# Patient Record
Sex: FEMALE | Race: WHITE
[De-identification: names, ages, dates, MRNs, and addresses within clinical notes are randomized per-mention and may not be internally consistent; named-entity substitution may affect disease eponyms.]

---

## 2017-07-10 ENCOUNTER — HOSPITAL ENCOUNTER (EMERGENCY)
Dept: HOSPITAL 53 - M ED | Age: 57
Discharge: HOME | End: 2017-07-10
Payer: COMMERCIAL

## 2017-07-10 VITALS — BODY MASS INDEX: 40.84 KG/M2 | HEIGHT: 63 IN | WEIGHT: 230.49 LBS

## 2017-07-10 VITALS — DIASTOLIC BLOOD PRESSURE: 58 MMHG | SYSTOLIC BLOOD PRESSURE: 119 MMHG

## 2017-07-10 DIAGNOSIS — G43.909: ICD-10-CM

## 2017-07-10 DIAGNOSIS — H82.9: ICD-10-CM

## 2017-07-10 DIAGNOSIS — Z79.899: ICD-10-CM

## 2017-07-10 DIAGNOSIS — Z87.891: ICD-10-CM

## 2017-07-10 DIAGNOSIS — Z79.82: ICD-10-CM

## 2017-07-10 DIAGNOSIS — Z98.84: ICD-10-CM

## 2017-07-10 DIAGNOSIS — I95.1: Primary | ICD-10-CM

## 2017-07-11 ENCOUNTER — HOSPITAL ENCOUNTER (EMERGENCY)
Dept: HOSPITAL 53 - M ED | Age: 57
Discharge: HOME | End: 2017-07-11
Payer: COMMERCIAL

## 2017-07-11 VITALS — WEIGHT: 248.02 LBS | HEIGHT: 63 IN | BODY MASS INDEX: 43.95 KG/M2

## 2017-07-11 VITALS — DIASTOLIC BLOOD PRESSURE: 74 MMHG | SYSTOLIC BLOOD PRESSURE: 138 MMHG

## 2017-07-11 DIAGNOSIS — E78.5: ICD-10-CM

## 2017-07-11 DIAGNOSIS — Z79.82: ICD-10-CM

## 2017-07-11 DIAGNOSIS — R42: Primary | ICD-10-CM

## 2017-07-11 LAB
ALBUMIN SERPL BCG-MCNC: 3.4 GM/DL (ref 3.2–5.2)
ALBUMIN/GLOB SERPL: 1.13 {RATIO} (ref 1–1.93)
ALP SERPL-CCNC: 92 U/L (ref 45–117)
ALT SERPL W P-5'-P-CCNC: 21 U/L (ref 12–78)
ANION GAP SERPL CALC-SCNC: 6 MEQ/L (ref 8–16)
AST SERPL-CCNC: 16 U/L (ref 15–37)
BASOPHILS # BLD AUTO: 0 K/MM3 (ref 0–0.2)
BASOPHILS NFR BLD AUTO: 0.4 % (ref 0–1)
BILIRUB CONJ SERPL-MCNC: < 0.1 MG/DL (ref 0–0.2)
BILIRUB SERPL-MCNC: 0.5 MG/DL (ref 0.2–1)
BUN SERPL-MCNC: 9 MG/DL (ref 7–18)
CALCIUM SERPL-MCNC: 8.9 MG/DL (ref 8.5–10.1)
CHLORIDE SERPL-SCNC: 108 MEQ/L (ref 98–107)
CO2 SERPL-SCNC: 28 MEQ/L (ref 21–32)
CREAT SERPL-MCNC: 0.77 MG/DL (ref 0.55–1.02)
EOSINOPHIL # BLD AUTO: 0.3 K/MM3 (ref 0–0.5)
EOSINOPHIL NFR BLD AUTO: 4.3 % (ref 0–3)
ERYTHROCYTE [DISTWIDTH] IN BLOOD BY AUTOMATED COUNT: 12.7 % (ref 11.5–14.5)
GFR SERPL CREATININE-BSD FRML MDRD: > 60 ML/MIN/{1.73_M2} (ref 51–?)
GLUCOSE SERPL-MCNC: 91 MG/DL (ref 70–105)
LARGE UNSTAINED CELL #: 0.1 K/MM3 (ref 0–0.4)
LARGE UNSTAINED CELL %: 1.4 % (ref 0–4)
LYMPHOCYTES # BLD AUTO: 2.6 K/MM3 (ref 1.5–4.5)
LYMPHOCYTES NFR BLD AUTO: 39.6 % (ref 24–44)
MCH RBC QN AUTO: 29.9 PG (ref 27–33)
MCHC RBC AUTO-ENTMCNC: 32.5 G/DL (ref 32–36.5)
MCV RBC AUTO: 92.1 FL (ref 80–96)
MONOCYTES # BLD AUTO: 0.3 K/MM3 (ref 0–0.8)
MONOCYTES NFR BLD AUTO: 4.4 % (ref 0–5)
NEUTROPHILS # BLD AUTO: 3.2 K/MM3 (ref 1.8–7.7)
NEUTROPHILS NFR BLD AUTO: 50 % (ref 36–66)
PLATELET # BLD AUTO: 279 K/MM3 (ref 150–450)
POTASSIUM SERPL-SCNC: 3.9 MEQ/L (ref 3.5–5.1)
PROT SERPL-MCNC: 6.4 GM/DL (ref 6.4–8.2)
SODIUM SERPL-SCNC: 142 MEQ/L (ref 136–145)
WBC # BLD AUTO: 6.3 K/MM3 (ref 4–10)

## 2017-07-11 PROCEDURE — 93005 ELECTROCARDIOGRAM TRACING: CPT

## 2017-07-11 PROCEDURE — 80076 HEPATIC FUNCTION PANEL: CPT

## 2017-07-11 PROCEDURE — 82550 ASSAY OF CK (CPK): CPT

## 2017-07-11 PROCEDURE — 70450 CT HEAD/BRAIN W/O DYE: CPT

## 2017-07-11 PROCEDURE — 80048 BASIC METABOLIC PNL TOTAL CA: CPT

## 2017-07-11 PROCEDURE — 93041 RHYTHM ECG TRACING: CPT

## 2017-07-11 PROCEDURE — 85025 COMPLETE CBC W/AUTO DIFF WBC: CPT

## 2017-07-11 PROCEDURE — 82553 CREATINE MB FRACTION: CPT

## 2017-07-11 PROCEDURE — 99284 EMERGENCY DEPT VISIT MOD MDM: CPT

## 2017-07-11 PROCEDURE — 94760 N-INVAS EAR/PLS OXIMETRY 1: CPT

## 2017-07-11 PROCEDURE — 71020: CPT

## 2017-07-11 PROCEDURE — 84443 ASSAY THYROID STIM HORMONE: CPT

## 2017-07-11 NOTE — REP
CT Head without contrast

 

HISTORY:  Altered mental status

 

COMPARISON: 09/03/2013

 

There is no intraparenchymal hemorrhage, acute infarct,  mass or midline shift.

The ventricular system is normal in appearance.  There is no extra cerebral

collection.  There is no fracture.  The visualized sinuses are clear.

 

IMPRESSION:  There is no intracranial lesion.

 

 

 

 

Signed by

Everardo Rincon MD 07/11/2017 10:54 A

## 2017-07-11 NOTE — REP
Chest two views

 

HISTORY: Altered mental status

 

Comparison: 09/03/2013

 

The lungs are clear.  The cardiac silhouette is enlarged.  The pulmonary

vasculature is normal in appearance.  The bony structure is intact.

 

IMPRESSION:  Cardiomegaly.

 

 

Signed by

Everardo Rincon MD 07/11/2017 11:10 A

## 2017-07-11 NOTE — ECGEPIP
Stationary ECG Study

                           [facility] - ED

                                       

                                       Test Date:    2017

Pat Name:     PHU KRUGER           Department:   

Patient ID:   [id number]                 Room:         -

Gender:       F                        Technician:   bao

:          1960               Requested By: SAGRARIO BRANDON

Order Number: [id number]     Reading MD:   Kimber Dick

                                 Measurements

Intervals                              Axis          

Rate:         53                       P:            53

ME:           165                      QRS:          3

QRSD:         103                      T:            42

QT:           428                                    

QTc:          405                                    

                           Interpretive Statements

SINUS BRADYCARDIA

SIMILAR 9/3/13

Electronically Signed On 2017 18:28:04 EDT by Kimber Dick

## 2019-12-03 ENCOUNTER — HOSPITAL ENCOUNTER (OUTPATIENT)
Dept: HOSPITAL 53 - M PLALAB | Age: 59
End: 2019-12-03
Attending: PHYSICIAN ASSISTANT
Payer: COMMERCIAL

## 2019-12-03 DIAGNOSIS — E55.9: ICD-10-CM

## 2019-12-03 DIAGNOSIS — E11.9: ICD-10-CM

## 2019-12-03 DIAGNOSIS — E78.2: Primary | ICD-10-CM

## 2019-12-03 LAB
25(OH)D3 SERPL-MCNC: 29.6 NG/ML (ref 30–100)
ALBUMIN SERPL BCG-MCNC: 3.6 GM/DL (ref 3.2–5.2)
ALT SERPL W P-5'-P-CCNC: 41 U/L (ref 12–78)
BILIRUB SERPL-MCNC: 0.5 MG/DL (ref 0.2–1)
BUN SERPL-MCNC: 13 MG/DL (ref 7–18)
CALCIUM SERPL-MCNC: 9.1 MG/DL (ref 8.8–10.2)
CHLORIDE SERPL-SCNC: 108 MEQ/L (ref 98–107)
CHOLEST SERPL-MCNC: 214 MG/DL (ref ?–200)
CHOLEST/HDLC SERPL: 3.96 {RATIO} (ref ?–5)
CO2 SERPL-SCNC: 31 MEQ/L (ref 21–32)
CREAT SERPL-MCNC: 0.81 MG/DL (ref 0.55–1.3)
EST. AVERAGE GLUCOSE BLD GHB EST-MCNC: 131 MG/DL (ref 60–110)
GFR SERPL CREATININE-BSD FRML MDRD: > 60 ML/MIN/{1.73_M2} (ref 45–?)
GLUCOSE SERPL-MCNC: 95 MG/DL (ref 70–100)
HDLC SERPL-MCNC: 54 MG/DL (ref 40–?)
LDLC SERPL CALC-MCNC: 145 MG/DL (ref ?–100)
NONHDLC SERPL-MCNC: 160 MG/DL
POTASSIUM SERPL-SCNC: 4.5 MEQ/L (ref 3.5–5.1)
PROT SERPL-MCNC: 6.7 GM/DL (ref 6.4–8.2)
SODIUM SERPL-SCNC: 143 MEQ/L (ref 136–145)
TRIGL SERPL-MCNC: 74 MG/DL (ref ?–150)

## 2020-01-10 ENCOUNTER — HOSPITAL ENCOUNTER (OUTPATIENT)
Dept: HOSPITAL 53 - M SFHCLACO | Age: 60
End: 2020-01-10
Attending: PHYSICIAN ASSISTANT
Payer: COMMERCIAL

## 2020-01-10 DIAGNOSIS — Z01.411: Primary | ICD-10-CM

## 2020-02-18 ENCOUNTER — HOSPITAL ENCOUNTER (OUTPATIENT)
Dept: HOSPITAL 53 - M PLAIMG | Age: 60
End: 2020-02-18
Attending: PHYSICIAN ASSISTANT
Payer: COMMERCIAL

## 2020-02-18 ENCOUNTER — HOSPITAL ENCOUNTER (OUTPATIENT)
Dept: HOSPITAL 53 - M WHC | Age: 60
End: 2020-02-18
Attending: PHYSICIAN ASSISTANT
Payer: COMMERCIAL

## 2020-02-18 DIAGNOSIS — Z12.31: Primary | ICD-10-CM

## 2020-02-18 DIAGNOSIS — Z78.0: ICD-10-CM

## 2020-02-18 DIAGNOSIS — G89.29: ICD-10-CM

## 2020-02-18 DIAGNOSIS — M25.571: Primary | ICD-10-CM

## 2020-02-18 DIAGNOSIS — M25.572: ICD-10-CM

## 2020-02-18 DIAGNOSIS — M15.0: ICD-10-CM

## 2020-02-18 LAB
CRP SERPL-MCNC: < 0.3 MG/DL (ref 0–0.3)
RHEUMATOID FACT SERPL-ACNC: < 10 IU/ML (ref ?–15)

## 2020-02-18 NOTE — REPMRS
Patient History

The patient states she had a clinical breast exam in 01/2020.

Patient is postmenopausal.

No known family history of cancer.

No Hormone Replacement Therapy

 

Digital Woman Screen Mammo: February 18, 2020 - Exam #: 

[id number]

Bilateral CC and MLO view(s) were taken.

 

Technologist: Jacqueline Nielsen, Technologist

Prior study comparison: July 16, 2014, bilateral digital mammo 

screening bilat, performed at [facility].

 

FINDINGS: There are scattered fibroglandular densities.  There 

has been no change in the appearance of the mammogram from the 

prior studies.  There is a mild amount of scattered 

fibroglandular density which is fairly symmetric. There is no 

interval development of dominant mass, architectural distortion, 

or grouped microcalcification suggestive of malignancy.

3-D tomosynthesis shows no additional findings.

 

Assessment: BI-RADS/ACR category 1 mammogram. Negative Mammogram.

 

Recommendation

Routine screening mammogram of both breasts in 1 year (for women 

over age 40).

This patient's Lifetime Breast Cancer Risk is estimated at 10.4 

%.

This mammogram was interpreted with the aid of an FDA-approved 

computer-aided dectection system.

 

Electronically Signed By: Gulshan Gimenez MD 02/18/20 [id number]

## 2020-02-18 NOTE — REPPI
Clinical:  Bilateral ankle pain.

 

Technique:  AP, lateral, bilateral oblique views of the right and left ankle.

 

Findings:

Generalized relatively symmetric mild/early moderate arthritic degenerative

changes are appreciated.  Lateral views demonstrate bilateral calcaneal heel

spurs and chronic calcification at the insertion of the Achilles tendon.  No

fracture or dislocation.  Ankle mortise appears intact bilaterally.

 

Impression:

Symmetric mild/early moderate arthritic changes.

 

 

Electronically Signed by

Milan Edwards MD 02/18/2020 01:05 P

## 2020-10-20 ENCOUNTER — HOSPITAL ENCOUNTER (OUTPATIENT)
Dept: HOSPITAL 53 - M SFHCADAM | Age: 60
End: 2020-10-20
Attending: PHYSICIAN ASSISTANT
Payer: COMMERCIAL

## 2020-10-20 DIAGNOSIS — E78.2: Primary | ICD-10-CM

## 2020-10-20 DIAGNOSIS — E55.9: ICD-10-CM

## 2020-10-20 DIAGNOSIS — E11.9: ICD-10-CM

## 2020-10-20 LAB
25(OH)D3 SERPL-MCNC: 49.7 NG/ML (ref 30–100)
ALBUMIN SERPL BCG-MCNC: 3.5 GM/DL (ref 3.2–5.2)
ALT SERPL W P-5'-P-CCNC: 30 U/L (ref 12–78)
BILIRUB SERPL-MCNC: 0.6 MG/DL (ref 0.2–1)
BUN SERPL-MCNC: 10 MG/DL (ref 7–18)
CALCIUM SERPL-MCNC: 9.1 MG/DL (ref 8.8–10.2)
CHLORIDE SERPL-SCNC: 106 MEQ/L (ref 98–107)
CHOLEST SERPL-MCNC: 203 MG/DL (ref ?–200)
CHOLEST/HDLC SERPL: 4.14 {RATIO} (ref ?–5)
CO2 SERPL-SCNC: 31 MEQ/L (ref 21–32)
CREAT SERPL-MCNC: 0.87 MG/DL (ref 0.55–1.3)
EST. AVERAGE GLUCOSE BLD GHB EST-MCNC: 134 MG/DL (ref 60–110)
GFR SERPL CREATININE-BSD FRML MDRD: > 60 ML/MIN/{1.73_M2} (ref 45–?)
GLUCOSE SERPL-MCNC: 101 MG/DL (ref 70–100)
HDLC SERPL-MCNC: 49 MG/DL (ref 40–?)
LDLC SERPL CALC-MCNC: 129 MG/DL (ref ?–100)
NONHDLC SERPL-MCNC: 154 MG/DL
POTASSIUM SERPL-SCNC: 4.7 MEQ/L (ref 3.5–5.1)
PROT SERPL-MCNC: 6.7 GM/DL (ref 6.4–8.2)
SODIUM SERPL-SCNC: 142 MEQ/L (ref 136–145)
TRIGL SERPL-MCNC: 124 MG/DL (ref ?–150)

## 2021-01-06 ENCOUNTER — HOSPITAL ENCOUNTER (EMERGENCY)
Dept: HOSPITAL 53 - M ED | Age: 61
Discharge: HOME | End: 2021-01-06
Payer: COMMERCIAL

## 2021-01-06 VITALS — HEIGHT: 62 IN | BODY MASS INDEX: 46.01 KG/M2 | WEIGHT: 250 LBS

## 2021-01-06 VITALS — SYSTOLIC BLOOD PRESSURE: 130 MMHG | DIASTOLIC BLOOD PRESSURE: 83 MMHG

## 2021-01-06 DIAGNOSIS — E11.9: ICD-10-CM

## 2021-01-06 DIAGNOSIS — E78.5: ICD-10-CM

## 2021-01-06 DIAGNOSIS — Z79.899: ICD-10-CM

## 2021-01-06 DIAGNOSIS — M54.12: Primary | ICD-10-CM

## 2021-03-04 ENCOUNTER — HOSPITAL ENCOUNTER (EMERGENCY)
Dept: HOSPITAL 53 - M ED | Age: 61
Discharge: HOME | End: 2021-03-04
Payer: COMMERCIAL

## 2021-03-04 VITALS — SYSTOLIC BLOOD PRESSURE: 141 MMHG | DIASTOLIC BLOOD PRESSURE: 69 MMHG

## 2021-03-04 VITALS — BODY MASS INDEX: 45.66 KG/M2 | HEIGHT: 63 IN | WEIGHT: 257.72 LBS

## 2021-03-04 DIAGNOSIS — S42.211A: Primary | ICD-10-CM

## 2021-03-04 DIAGNOSIS — E66.9: ICD-10-CM
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
## 2021-03-04 DIAGNOSIS — E78.5: ICD-10-CM

## 2021-03-04 DIAGNOSIS — M19.021: ICD-10-CM

## 2021-03-04 DIAGNOSIS — Z79.899: ICD-10-CM
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
## 2021-03-04 DIAGNOSIS — G25.81: ICD-10-CM

## 2021-03-04 DIAGNOSIS — Z98.84: ICD-10-CM

## 2021-03-04 DIAGNOSIS — S40.021A: ICD-10-CM

## 2021-03-04 LAB
APTT BLD: 28.1 SECONDS (ref 24.2–38.5)
BASOPHILS # BLD AUTO: 0 10^3/UL (ref 0–0.2)
BASOPHILS NFR BLD AUTO: 0.4 % (ref 0–1)
EOSINOPHIL # BLD AUTO: 0.1 10^3/UL (ref 0–0.5)
EOSINOPHIL NFR BLD AUTO: 0.7 % (ref 0–3)
HCT VFR BLD AUTO: 45.9 % (ref 36–47)
HGB BLD-MCNC: 14.5 G/DL (ref 12–15.5)
INR PPP: 1
LYMPHOCYTES # BLD AUTO: 2 10^3/UL (ref 1.5–5)
LYMPHOCYTES NFR BLD AUTO: 18.4 % (ref 24–44)
MCH RBC QN AUTO: 29.3 PG (ref 27–33)
MCHC RBC AUTO-ENTMCNC: 31.6 G/DL (ref 32–36.5)
MCV RBC AUTO: 92.7 FL (ref 80–96)
MONOCYTES # BLD AUTO: 0.6 10^3/UL (ref 0–0.8)
MONOCYTES NFR BLD AUTO: 5.8 % (ref 2–8)
NEUTROPHILS # BLD AUTO: 8 10^3/UL (ref 1.5–8.5)
NEUTROPHILS NFR BLD AUTO: 74.2 % (ref 36–66)
PLATELET # BLD AUTO: 298 10^3/UL (ref 150–450)
PROTHROMBIN TIME: 13.4 SECONDS (ref 12.5–14.3)
RBC # BLD AUTO: 4.95 10^6/UL (ref 4–5.4)
WBC # BLD AUTO: 10.7 10^3/UL (ref 4–10)

## 2021-03-04 PROCEDURE — 85025 COMPLETE CBC W/AUTO DIFF WBC: CPT

## 2021-03-04 PROCEDURE — 85730 THROMBOPLASTIN TIME PARTIAL: CPT

## 2021-03-04 PROCEDURE — 73030 X-RAY EXAM OF SHOULDER: CPT

## 2021-03-04 PROCEDURE — 73080 X-RAY EXAM OF ELBOW: CPT

## 2021-03-04 PROCEDURE — 73060 X-RAY EXAM OF HUMERUS: CPT

## 2021-03-04 PROCEDURE — 99284 EMERGENCY DEPT VISIT MOD MDM: CPT

## 2021-03-04 PROCEDURE — 85610 PROTHROMBIN TIME: CPT

## 2021-03-04 PROCEDURE — 96374 THER/PROPH/DIAG INJ IV PUSH: CPT

## 2021-03-04 PROCEDURE — 96375 TX/PRO/DX INJ NEW DRUG ADDON: CPT

## 2021-03-04 PROCEDURE — 80047 BASIC METABLC PNL IONIZED CA: CPT

## 2021-03-04 NOTE — REP
INDICATION:

fall. R proximal humerus fx



COMPARISON:

None.



TECHNIQUE:

Internal rotation, external rotation, and Y view.



FINDINGS:

There is a comminuted minimally displaced fracture through the surgical neck of the

proximal humerus with overlying soft tissue swelling.  The acromioclavicular joint

appears intact.



IMPRESSION:

Comminuted fracture of the surgical neck proximal humerus.





<Electronically signed by Milan Edwards > 03/04/21 [id number]

## 2021-03-04 NOTE — REP
INDICATION:

deformity



COMPARISON:

None.



TECHNIQUE:

AP, lateral, oblique views of the right elbow



FINDINGS:

Age-related degenerative changes are appreciated.  No obvious acute elbow fracture is

identified although small fracture versus chronic osteophyte at the coronary process

cannot definitively be differentiated.  No obvious joint effusion and normal position

to the anterior fat pad suggests a nonacute finding.



IMPRESSION:

Presumed degenerative changes as noted above.  No definite acute fracture.





<Electronically signed by Milan Edwards > 03/04/21 [id number]

## 2021-03-04 NOTE — REP
INDICATION:

deformity



COMPARISON:

None.



TECHNIQUE:

Two views of the right humerus



FINDINGS:

There is a comminuted mildly displaced fracture through the surgical neck of the

proximal humerus.  Remainder of the visualized osseous structures and surrounding soft

tissues are grossly unremarkable.



IMPRESSION:

Comminuted fracture of the surgical neck proximal humeral metaphysis.





<Electronically signed by Milan Edwards > 03/04/21 [id number]

## 2021-03-08 ENCOUNTER — HOSPITAL ENCOUNTER (OUTPATIENT)
Dept: HOSPITAL 53 - M SOG | Age: 61
End: 2021-03-08
Attending: ORTHOPAEDIC SURGERY
Payer: COMMERCIAL

## 2021-03-08 DIAGNOSIS — S42.221D: Primary | ICD-10-CM

## 2021-03-10 NOTE — REP
INDICATION:

F/U FX.



COMPARISON:

None.



TECHNIQUE:

Axillary and internal rotation views of the right shoulder



FINDINGS:

She sees relatively stable comminuted fracture to the proximal humeral metaphysis

again noted.



IMPRESSION:

Stable comminuted fracture to the proximal humeral metaphysis.





<Electronically signed by Milan Edwards > 03/10/21 0780

## 2021-03-29 ENCOUNTER — HOSPITAL ENCOUNTER (OUTPATIENT)
Dept: HOSPITAL 53 - M SOG | Age: 61
End: 2021-03-29
Attending: ORTHOPAEDIC SURGERY
Payer: COMMERCIAL

## 2021-03-29 DIAGNOSIS — S42.221D: Primary | ICD-10-CM
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
## 2021-03-29 NOTE — REP
INDICATION:

F/U FX.



COMPARISON:

Comparison right shoulder radiographs March 8, 2021 and March 4, 2021..



TECHNIQUE:

Four views are provided.



FINDINGS:

An impacted surgical neck fracture of the right humerus is again seen with mineralized

callus visible medially and laterally on today's radiographs.  No change in position.

Glenohumeral articulation is normally aligned.  There is some AC joint osteoarthritic

hypertrophy.



IMPRESSION:

Healing surgical neck fracture right proximal humerus.





<Electronically signed by Gulshan Gimenez > 03/29/21 1955

## 2021-04-05 ENCOUNTER — HOSPITAL ENCOUNTER (OUTPATIENT)
Dept: HOSPITAL 53 - M SFHCADAM | Age: 61
End: 2021-04-05
Attending: PHYSICIAN ASSISTANT
Payer: COMMERCIAL

## 2021-04-05 DIAGNOSIS — E55.9: ICD-10-CM

## 2021-04-05 DIAGNOSIS — E78.2: Primary | ICD-10-CM

## 2021-04-05 DIAGNOSIS — E11.9: ICD-10-CM

## 2021-04-05 LAB
25(OH)D3 SERPL-MCNC: 37.5 NG/ML (ref 30–100)
ALBUMIN SERPL BCG-MCNC: 3.8 GM/DL (ref 3.2–5.2)
ALT SERPL W P-5'-P-CCNC: 38 U/L (ref 12–78)
BILIRUB SERPL-MCNC: 0.6 MG/DL (ref 0.2–1)
BUN SERPL-MCNC: 16 MG/DL (ref 7–18)
CALCIUM SERPL-MCNC: 9.5 MG/DL (ref 8.8–10.2)
CHLORIDE SERPL-SCNC: 107 MEQ/L (ref 98–107)
CHOLEST SERPL-MCNC: 236 MG/DL (ref ?–200)
CHOLEST/HDLC SERPL: 4 {RATIO} (ref ?–5)
CO2 SERPL-SCNC: 31 MEQ/L (ref 21–32)
CREAT SERPL-MCNC: 0.75 MG/DL (ref 0.55–1.3)
EST. AVERAGE GLUCOSE BLD GHB EST-MCNC: 123 MG/DL (ref 60–110)
GFR SERPL CREATININE-BSD FRML MDRD: > 60 ML/MIN/{1.73_M2} (ref 45–?)
GLUCOSE SERPL-MCNC: 100 MG/DL (ref 70–100)
HDLC SERPL-MCNC: 59 MG/DL (ref 40–?)
LDLC SERPL CALC-MCNC: 161 MG/DL (ref ?–100)
NONHDLC SERPL-MCNC: 177 MG/DL
POTASSIUM SERPL-SCNC: 4.4 MEQ/L (ref 3.5–5.1)
PROT SERPL-MCNC: 6.7 GM/DL (ref 6.4–8.2)
SODIUM SERPL-SCNC: 143 MEQ/L (ref 136–145)
TRIGL SERPL-MCNC: 79 MG/DL (ref ?–150)

## 2021-04-28 ENCOUNTER — HOSPITAL ENCOUNTER (OUTPATIENT)
Dept: HOSPITAL 53 - M PT | Age: 61
LOS: 2 days | End: 2021-04-30
Attending: ORTHOPAEDIC SURGERY
Payer: COMMERCIAL

## 2021-04-28 DIAGNOSIS — S42.221D: Primary | ICD-10-CM
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
## 2021-05-10 ENCOUNTER — HOSPITAL ENCOUNTER (OUTPATIENT)
Dept: HOSPITAL 53 - M PT | Age: 61
LOS: 21 days | End: 2021-05-31
Attending: ORTHOPAEDIC SURGERY
Payer: COMMERCIAL

## 2021-05-10 DIAGNOSIS — S42.221D: Primary | ICD-10-CM

## 2021-05-14 ENCOUNTER — HOSPITAL ENCOUNTER (OUTPATIENT)
Dept: HOSPITAL 53 - M SOG | Age: 61
End: 2021-05-14
Attending: ORTHOPAEDIC SURGERY
Payer: COMMERCIAL

## 2021-05-14 DIAGNOSIS — S42.221D: Primary | ICD-10-CM

## 2021-05-16 NOTE — REP
INDICATION:

F/U FX.



COMPARISON:

03/29/2021



TECHNIQUE:

Internal rotation, external rotation, axillary and Y-view of the right shoulder.



FINDINGS:

Comminuted fracture of the humeral head/neck noted.  Periosteal reaction and callus

formation suggests healing.  Posterolateral displaced fracture fragments remain

unchanged in position.



IMPRESSION:

Healing comminuted fracture of the proximal humeral head/neck.





<Electronically signed by Milan Edwards > 05/16/21 0632

## 2021-07-26 ENCOUNTER — HOSPITAL ENCOUNTER (OUTPATIENT)
Dept: HOSPITAL 53 - M SFHCADAM | Age: 61
End: 2021-07-26
Attending: PHYSICIAN ASSISTANT
Payer: COMMERCIAL

## 2021-07-26 DIAGNOSIS — E55.9: ICD-10-CM

## 2021-07-26 DIAGNOSIS — E78.2: Primary | ICD-10-CM

## 2021-07-26 DIAGNOSIS — E11.9: ICD-10-CM

## 2021-07-26 LAB
ALBUMIN SERPL BCG-MCNC: 3.5 GM/DL (ref 3.2–5.2)
ALT SERPL W P-5'-P-CCNC: 24 U/L (ref 12–78)
BILIRUB SERPL-MCNC: 0.5 MG/DL (ref 0.2–1)
BUN SERPL-MCNC: 12 MG/DL (ref 7–18)
CALCIUM SERPL-MCNC: 9 MG/DL (ref 8.8–10.2)
CHLORIDE SERPL-SCNC: 109 MEQ/L (ref 98–107)
CHOLEST SERPL-MCNC: 233 MG/DL (ref ?–200)
CHOLEST/HDLC SERPL: 4.85 {RATIO} (ref ?–5)
CO2 SERPL-SCNC: 30 MEQ/L (ref 21–32)
CREAT SERPL-MCNC: 0.81 MG/DL (ref 0.55–1.3)
EST. AVERAGE GLUCOSE BLD GHB EST-MCNC: 128 MG/DL (ref 60–110)
GFR SERPL CREATININE-BSD FRML MDRD: > 60 ML/MIN/{1.73_M2} (ref 45–?)
GLUCOSE SERPL-MCNC: 90 MG/DL (ref 70–100)
HDLC SERPL-MCNC: 48 MG/DL (ref 40–?)
LDLC SERPL CALC-MCNC: 164 MG/DL (ref ?–100)
NONHDLC SERPL-MCNC: 185 MG/DL
POTASSIUM SERPL-SCNC: 4.5 MEQ/L (ref 3.5–5.1)
PROT SERPL-MCNC: 6.6 GM/DL (ref 6.4–8.2)
SODIUM SERPL-SCNC: 143 MEQ/L (ref 136–145)
TRIGL SERPL-MCNC: 103 MG/DL (ref ?–150)

## 2021-11-05 ENCOUNTER — HOSPITAL ENCOUNTER (OUTPATIENT)
Dept: HOSPITAL 53 - M SFHCADAM | Age: 61
End: 2021-11-05
Attending: PHYSICIAN ASSISTANT
Payer: COMMERCIAL

## 2021-11-05 DIAGNOSIS — E78.2: Primary | ICD-10-CM

## 2021-11-05 DIAGNOSIS — E55.9: ICD-10-CM

## 2021-11-05 DIAGNOSIS — E11.9: ICD-10-CM

## 2021-11-05 LAB
25(OH)D3 SERPL-MCNC: 44.4 NG/ML (ref 30–100)
ALBUMIN SERPL BCG-MCNC: 3.2 GM/DL (ref 3.2–5.2)
ALT SERPL W P-5'-P-CCNC: 22 U/L (ref 12–78)
BILIRUB SERPL-MCNC: 0.6 MG/DL (ref 0.2–1)
BUN SERPL-MCNC: 11 MG/DL (ref 7–18)
CALCIUM SERPL-MCNC: 9.2 MG/DL (ref 8.8–10.2)
CHLORIDE SERPL-SCNC: 111 MEQ/L (ref 98–107)
CHOLEST SERPL-MCNC: 193 MG/DL (ref ?–200)
CHOLEST/HDLC SERPL: 3.57 {RATIO} (ref ?–5)
CO2 SERPL-SCNC: 30 MEQ/L (ref 21–32)
CREAT SERPL-MCNC: 0.78 MG/DL (ref 0.55–1.3)
EST. AVERAGE GLUCOSE BLD GHB EST-MCNC: 128 MG/DL (ref 60–110)
GFR SERPL CREATININE-BSD FRML MDRD: > 60 ML/MIN/{1.73_M2} (ref 45–?)
GLUCOSE SERPL-MCNC: 108 MG/DL (ref 70–100)
HDLC SERPL-MCNC: 54 MG/DL (ref 40–?)
LDLC SERPL CALC-MCNC: 125 MG/DL (ref ?–100)
NONHDLC SERPL-MCNC: 139 MG/DL
POTASSIUM SERPL-SCNC: 5 MEQ/L (ref 3.5–5.1)
PROT SERPL-MCNC: 6.3 GM/DL (ref 6.4–8.2)
SODIUM SERPL-SCNC: 144 MEQ/L (ref 136–145)
TRIGL SERPL-MCNC: 71 MG/DL (ref ?–150)

## 2022-05-09 ENCOUNTER — HOSPITAL ENCOUNTER (OUTPATIENT)
Dept: HOSPITAL 53 - M SFHCADAM | Age: 62
End: 2022-05-09
Attending: PHYSICIAN ASSISTANT
Payer: COMMERCIAL

## 2022-05-09 DIAGNOSIS — E55.9: ICD-10-CM

## 2022-05-09 DIAGNOSIS — E78.2: Primary | ICD-10-CM

## 2022-05-09 DIAGNOSIS — E11.9: ICD-10-CM

## 2022-05-09 LAB
25(OH)D3 SERPL-MCNC: 48.3 NG/ML (ref 30–100)
ALBUMIN SERPL BCG-MCNC: 3.5 GM/DL (ref 3.2–5.2)
ALT SERPL W P-5'-P-CCNC: 28 U/L (ref 12–78)
BILIRUB SERPL-MCNC: 0.5 MG/DL (ref 0.2–1)
BUN SERPL-MCNC: 13 MG/DL (ref 7–18)
CALCIUM SERPL-MCNC: 9.5 MG/DL (ref 8.8–10.2)
CHLORIDE SERPL-SCNC: 110 MEQ/L (ref 98–107)
CHOLEST SERPL-MCNC: 235 MG/DL (ref ?–200)
CHOLEST/HDLC SERPL: 4.05 {RATIO} (ref ?–5)
CO2 SERPL-SCNC: 29 MEQ/L (ref 21–32)
CREAT SERPL-MCNC: 0.85 MG/DL (ref 0.55–1.3)
EST. AVERAGE GLUCOSE BLD GHB EST-MCNC: 131 MG/DL (ref 60–110)
GFR SERPL CREATININE-BSD FRML MDRD: > 60 ML/MIN/{1.73_M2} (ref 45–?)
GLUCOSE SERPL-MCNC: 113 MG/DL (ref 70–100)
HDLC SERPL-MCNC: 58 MG/DL (ref 40–?)
LDLC SERPL CALC-MCNC: 159 MG/DL (ref ?–100)
NONHDLC SERPL-MCNC: 177 MG/DL
POTASSIUM SERPL-SCNC: 4.2 MEQ/L (ref 3.5–5.1)
PROT SERPL-MCNC: 6.5 GM/DL (ref 6.4–8.2)
SODIUM SERPL-SCNC: 144 MEQ/L (ref 136–145)
TRIGL SERPL-MCNC: 88 MG/DL (ref ?–150)

## 2022-08-17 ENCOUNTER — HOSPITAL ENCOUNTER (EMERGENCY)
Dept: HOSPITAL 53 - M ED | Age: 62
LOS: 1 days | Discharge: HOME | End: 2022-08-18
Payer: COMMERCIAL

## 2022-08-17 VITALS — HEIGHT: 63 IN | WEIGHT: 245.51 LBS | BODY MASS INDEX: 43.5 KG/M2

## 2022-08-17 VITALS — DIASTOLIC BLOOD PRESSURE: 65 MMHG | SYSTOLIC BLOOD PRESSURE: 150 MMHG
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
## 2022-08-17 DIAGNOSIS — Z79.899: ICD-10-CM
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
## 2022-08-17 DIAGNOSIS — E78.5: ICD-10-CM

## 2022-08-17 DIAGNOSIS — Z87.891: ICD-10-CM

## 2022-08-17 DIAGNOSIS — M25.562: Primary | ICD-10-CM

## 2022-12-19 ENCOUNTER — HOSPITAL ENCOUNTER (OUTPATIENT)
Dept: HOSPITAL 53 - M PT | Age: 62
LOS: 12 days | End: 2022-12-31
Attending: PHYSICIAN ASSISTANT
Payer: COMMERCIAL

## 2022-12-19 DIAGNOSIS — M17.12: Primary | ICD-10-CM

## 2023-03-28 ENCOUNTER — HOSPITAL ENCOUNTER (OUTPATIENT)
Dept: HOSPITAL 53 - M SFHCADAM | Age: 63
End: 2023-03-28
Attending: PHYSICIAN ASSISTANT
Payer: COMMERCIAL

## 2023-03-28 DIAGNOSIS — E78.2: Primary | ICD-10-CM

## 2023-03-28 DIAGNOSIS — R73.03: ICD-10-CM

## 2023-03-28 DIAGNOSIS — G25.81: ICD-10-CM

## 2023-03-28 LAB
ALBUMIN SERPL BCG-MCNC: 3.5 G/DL (ref 3.2–5.2)
ALP SERPL-CCNC: 90 U/L (ref 46–116)
ALT SERPL W P-5'-P-CCNC: 40 U/L (ref 7–40)
AST SERPL-CCNC: 25 U/L (ref ?–34)
BASOPHILS # BLD AUTO: 0.1 10^3/UL (ref 0–0.2)
BASOPHILS NFR BLD AUTO: 0.8 % (ref 0–1)
BILIRUB SERPL-MCNC: 0.7 MG/DL (ref 0.3–1.2)
BUN SERPL-MCNC: 19 MG/DL (ref 9–23)
CALCIUM SERPL-MCNC: 9.3 MG/DL (ref 8.3–10.6)
CHLORIDE SERPL-SCNC: 105 MMOL/L (ref 98–107)
CHOLEST SERPL-MCNC: 222 MG/DL (ref ?–200)
CHOLEST/HDLC SERPL: 3.42 {RATIO} (ref ?–5)
CO2 SERPL-SCNC: 31 MMOL/L (ref 20–31)
CREAT SERPL-MCNC: 0.79 MG/DL (ref 0.55–1.3)
EOSINOPHIL # BLD AUTO: 0.2 10^3/UL (ref 0–0.5)
EOSINOPHIL NFR BLD AUTO: 3.2 % (ref 0–3)
EST. AVERAGE GLUCOSE BLD GHB EST-MCNC: 123 MG/DL (ref 60–110)
FERRITIN SERPL-MCNC: 33 NG/ML (ref 7.3–270.7)
GFR SERPL CREATININE-BSD FRML MDRD: > 60 ML/MIN/{1.73_M2} (ref 45–?)
GLUCOSE SERPL-MCNC: 100 MG/DL (ref 74–106)
HCT VFR BLD AUTO: 44.6 % (ref 36–47)
HDLC SERPL-MCNC: 64.9 MG/DL (ref 40–?)
HGB BLD-MCNC: 13.9 G/DL (ref 12–15.5)
IRON SATN MFR SERPL: 27.2 % (ref 13.2–45)
IRON SERPL-MCNC: 102 UG/DL (ref 50–170)
LDLC SERPL CALC-MCNC: 135.3 MG/DL (ref ?–100)
LYMPHOCYTES # BLD AUTO: 3 10^3/UL (ref 1.5–5)
LYMPHOCYTES NFR BLD AUTO: 40.6 % (ref 24–44)
MCH RBC QN AUTO: 29.4 PG (ref 27–33)
MCHC RBC AUTO-ENTMCNC: 31.2 G/DL (ref 32–36.5)
MCV RBC AUTO: 94.3 FL (ref 80–96)
MONOCYTES # BLD AUTO: 0.6 10^3/UL (ref 0–0.8)
MONOCYTES NFR BLD AUTO: 8.5 % (ref 2–8)
NEUTROPHILS # BLD AUTO: 3.4 10^3/UL (ref 1.5–8.5)
NEUTROPHILS NFR BLD AUTO: 46.5 % (ref 36–66)
NONHDLC SERPL-MCNC: 157.1 MG/DL
PLATELET # BLD AUTO: 297 10^3/UL (ref 150–450)
POTASSIUM SERPL-SCNC: 4.5 MMOL/L (ref 3.5–5.1)
PROT SERPL-MCNC: 6.3 G/DL (ref 5.7–8.2)
RBC # BLD AUTO: 4.73 10^6/UL (ref 4–5.4)
SODIUM SERPL-SCNC: 141 MMOL/L (ref 136–145)
T4 FREE SERPL-MCNC: 1.06 NG/DL (ref 0.89–1.76)
TIBC SERPL-MCNC: 375 UG/DL (ref 250–425)
TRIGL SERPL-MCNC: 109 MG/DL (ref ?–150)
TSH SERPL DL<=0.005 MIU/L-ACNC: 2.03 UIU/ML (ref 0.55–4.78)
VIT B12 SERPL-MCNC: 373 PG/ML (ref 211–911)
WBC # BLD AUTO: 7.3 10^3/UL (ref 4–10)

## 2023-04-18 ENCOUNTER — HOSPITAL ENCOUNTER (OUTPATIENT)
Dept: HOSPITAL 53 - M WHC | Age: 63
End: 2023-04-18
Attending: PHYSICIAN ASSISTANT
Payer: COMMERCIAL

## 2023-04-18 DIAGNOSIS — Z12.31: Primary | ICD-10-CM

## 2023-04-18 DIAGNOSIS — Z13.820: ICD-10-CM

## 2023-04-18 DIAGNOSIS — M85.852: ICD-10-CM

## 2023-04-18 DIAGNOSIS — M85.851: ICD-10-CM

## 2023-04-26 ENCOUNTER — HOSPITAL ENCOUNTER (OUTPATIENT)
Dept: HOSPITAL 53 - M OPP | Age: 63
Discharge: HOME | End: 2023-04-26
Attending: SURGERY
Payer: COMMERCIAL

## 2023-04-26 VITALS — HEIGHT: 63 IN | WEIGHT: 233 LBS | BODY MASS INDEX: 41.29 KG/M2

## 2023-04-26 VITALS — SYSTOLIC BLOOD PRESSURE: 148 MMHG | DIASTOLIC BLOOD PRESSURE: 88 MMHG

## 2023-04-26 DIAGNOSIS — Z98.84: ICD-10-CM

## 2023-04-26 DIAGNOSIS — Z99.89: ICD-10-CM

## 2023-04-26 DIAGNOSIS — G47.33: ICD-10-CM

## 2023-04-26 DIAGNOSIS — K64.1: ICD-10-CM

## 2023-04-26 DIAGNOSIS — D12.5: ICD-10-CM

## 2023-04-26 DIAGNOSIS — Z12.11: Primary | ICD-10-CM

## 2023-04-26 DIAGNOSIS — Z79.82: ICD-10-CM

## 2023-04-26 DIAGNOSIS — K57.30: ICD-10-CM

## 2023-04-26 DIAGNOSIS — Z79.02: ICD-10-CM

## 2023-09-22 ENCOUNTER — HOSPITAL ENCOUNTER (OUTPATIENT)
Dept: HOSPITAL 53 - M SFHCADAM | Age: 63
End: 2023-09-22
Attending: PHYSICIAN ASSISTANT
Payer: COMMERCIAL

## 2023-09-22 DIAGNOSIS — E55.9: Primary | ICD-10-CM

## 2023-09-22 DIAGNOSIS — R73.03: ICD-10-CM

## 2023-09-22 LAB
25(OH)D3 SERPL-MCNC: 38.2 NG/ML (ref 20–100)
BUN SERPL-MCNC: 17 MG/DL (ref 9–23)
CALCIUM SERPL-MCNC: 9 MG/DL (ref 8.3–10.6)
CHLORIDE SERPL-SCNC: 109 MMOL/L (ref 98–107)
CO2 SERPL-SCNC: 29 MMOL/L (ref 20–31)
CREAT SERPL-MCNC: 0.79 MG/DL (ref 0.55–1.3)
EST. AVERAGE GLUCOSE BLD GHB EST-MCNC: 114 MG/DL (ref 60–110)
GFR SERPL CREATININE-BSD FRML MDRD: > 60 ML/MIN/{1.73_M2} (ref 45–?)
GLUCOSE SERPL-MCNC: 93 MG/DL (ref 74–106)
POTASSIUM SERPL-SCNC: 4.5 MMOL/L (ref 3.5–5.1)
SODIUM SERPL-SCNC: 144 MMOL/L (ref 136–145)

## 2024-03-12 ENCOUNTER — HOSPITAL ENCOUNTER (OUTPATIENT)
Dept: HOSPITAL 53 - M SFHCADAM | Age: 64
End: 2024-03-12
Attending: PHYSICIAN ASSISTANT
Payer: MEDICARE

## 2024-03-12 DIAGNOSIS — E55.9: ICD-10-CM

## 2024-03-12 DIAGNOSIS — R73.03: ICD-10-CM

## 2024-03-12 DIAGNOSIS — E78.2: Primary | ICD-10-CM

## 2024-03-12 LAB
25(OH)D3 SERPL-MCNC: 61.9 NG/ML (ref 20–100)
BASOPHILS # BLD AUTO: 0 10^3/UL (ref 0–0.2)
BASOPHILS NFR BLD AUTO: 0.4 % (ref 0–1)
BUN SERPL-MCNC: 13 MG/DL (ref 9–23)
CALCIUM SERPL-MCNC: 8.7 MG/DL (ref 8.3–10.6)
CHLORIDE SERPL-SCNC: 108 MMOL/L (ref 98–107)
CHOLEST SERPL-MCNC: 141 MG/DL (ref ?–200)
CHOLEST/HDLC SERPL: 3.8 {RATIO} (ref ?–5)
CO2 SERPL-SCNC: 30 MMOL/L (ref 20–31)
CREAT SERPL-MCNC: 0.72 MG/DL (ref 0.55–1.3)
EOSINOPHIL # BLD AUTO: 0.2 10^3/UL (ref 0–0.5)
EOSINOPHIL NFR BLD AUTO: 3.2 % (ref 0–3)
EST. AVERAGE GLUCOSE BLD GHB EST-MCNC: 120 MG/DL (ref 60–110)
GFR SERPL CREATININE-BSD FRML MDRD: > 60 ML/MIN/{1.73_M2} (ref 45–?)
GLUCOSE SERPL-MCNC: 100 MG/DL (ref 74–106)
HCT VFR BLD AUTO: 45.9 % (ref 36–47)
HDLC SERPL-MCNC: 37.1 MG/DL (ref 40–?)
HGB BLD-MCNC: 14.4 G/DL (ref 12–15.5)
LDLC SERPL CALC-MCNC: 84.3 MG/DL (ref ?–100)
LYMPHOCYTES # BLD AUTO: 2.5 10^3/UL (ref 1.5–5)
LYMPHOCYTES NFR BLD AUTO: 35.2 % (ref 24–44)
MCH RBC QN AUTO: 29.3 PG (ref 27–33)
MCHC RBC AUTO-ENTMCNC: 31.4 G/DL (ref 32–36.5)
MCV RBC AUTO: 93.3 FL (ref 80–96)
MONOCYTES # BLD AUTO: 0.7 10^3/UL (ref 0–0.8)
MONOCYTES NFR BLD AUTO: 9.4 % (ref 2–8)
NEUTROPHILS # BLD AUTO: 3.7 10^3/UL (ref 1.5–8.5)
NEUTROPHILS NFR BLD AUTO: 51.7 % (ref 36–66)
NONHDLC SERPL-MCNC: 103.9 MG/DL
PLATELET # BLD AUTO: 279 10^3/UL (ref 150–450)
POTASSIUM SERPL-SCNC: 4.2 MMOL/L (ref 3.5–5.1)
RBC # BLD AUTO: 4.92 10^6/UL (ref 4–5.4)
SODIUM SERPL-SCNC: 143 MMOL/L (ref 136–145)
T4 FREE SERPL-MCNC: 1.15 NG/DL (ref 0.89–1.76)
TRIGL SERPL-MCNC: 98 MG/DL (ref ?–150)
TSH SERPL DL<=0.005 MIU/L-ACNC: 1.65 UIU/ML (ref 0.55–4.78)
WBC # BLD AUTO: 7.2 10^3/UL (ref 4–10)

## 2025-03-10 ENCOUNTER — HOSPITAL ENCOUNTER (OUTPATIENT)
Dept: HOSPITAL 53 - M SFHCADAM | Age: 65
End: 2025-03-10
Attending: PHYSICIAN ASSISTANT
Payer: MEDICARE

## 2025-03-10 DIAGNOSIS — E11.9: Primary | ICD-10-CM

## 2025-03-10 DIAGNOSIS — E66.01: ICD-10-CM

## 2025-03-10 DIAGNOSIS — E78.2: ICD-10-CM

## 2025-03-10 LAB
ALBUMIN SERPL BCG-MCNC: 3.7 G/DL (ref 3.2–5.2)
ALP SERPL-CCNC: 102 U/L (ref 35–104)
ALT SERPL W P-5'-P-CCNC: 25 U/L (ref 7–40)
AST SERPL-CCNC: 24 U/L (ref ?–34)
BASOPHILS # BLD AUTO: 0 10^3/UL (ref 0–0.2)
BASOPHILS NFR BLD AUTO: 0.5 % (ref 0–1)
BILIRUB SERPL-MCNC: 0.8 MG/DL (ref 0.3–1.2)
BUN SERPL-MCNC: 14 MG/DL (ref 9–23)
CALCIUM SERPL-MCNC: 9.7 MG/DL (ref 8.3–10.6)
CHLORIDE SERPL-SCNC: 104 MMOL/L (ref 98–107)
CHOLEST SERPL-MCNC: 181 MG/DL (ref ?–200)
CHOLEST/HDLC SERPL: 3.81 {RATIO} (ref ?–5)
CO2 SERPL-SCNC: 30 MMOL/L (ref 20–31)
CREAT SERPL-MCNC: 0.85 MG/DL (ref 0.55–1.3)
EOSINOPHIL # BLD AUTO: 0.2 10^3/UL (ref 0–0.5)
EOSINOPHIL NFR BLD AUTO: 2.3 % (ref 0–3)
EST. AVERAGE GLUCOSE BLD GHB EST-MCNC: 114 MG/DL (ref 60–110)
GFR SERPL CREATININE-BSD FRML MDRD: > 60 ML/MIN/{1.73_M2} (ref 45–?)
GLUCOSE SERPL-MCNC: 95 MG/DL (ref 74–106)
HCT VFR BLD AUTO: 45.8 % (ref 36–47)
HDLC SERPL-MCNC: 47.4 MG/DL (ref 40–?)
HGB BLD-MCNC: 14.6 G/DL (ref 12–15.5)
LDLC SERPL CALC-MCNC: 115.4 MG/DL (ref ?–100)
LYMPHOCYTES # BLD AUTO: 2.8 10^3/UL (ref 1.5–5)
LYMPHOCYTES NFR BLD AUTO: 42.8 % (ref 24–44)
MCH RBC QN AUTO: 29.7 PG (ref 27–33)
MCHC RBC AUTO-ENTMCNC: 31.9 G/DL (ref 32–36.5)
MCV RBC AUTO: 93.3 FL (ref 80–96)
MONOCYTES # BLD AUTO: 0.6 10^3/UL (ref 0–0.8)
MONOCYTES NFR BLD AUTO: 9 % (ref 2–8)
NEUTROPHILS # BLD AUTO: 3 10^3/UL (ref 1.5–8.5)
NEUTROPHILS NFR BLD AUTO: 45.2 % (ref 36–66)
NONHDLC SERPL-MCNC: 133.6 MG/DL
PLATELET # BLD AUTO: 259 10^3/UL (ref 150–450)
POTASSIUM SERPL-SCNC: 4.7 MMOL/L (ref 3.5–5.1)
PROT SERPL-MCNC: 6.8 G/DL (ref 5.7–8.2)
RBC # BLD AUTO: 4.91 10^6/UL (ref 4–5.4)
SODIUM SERPL-SCNC: 142 MMOL/L (ref 136–145)
T4 FREE SERPL-MCNC: 1.28 NG/DL (ref 0.89–1.76)
TRIGL SERPL-MCNC: 91 MG/DL (ref ?–150)
TSH SERPL DL<=0.005 MIU/L-ACNC: 1.97 UIU/ML (ref 0.55–4.78)
WBC # BLD AUTO: 6.6 10^3/UL (ref 4–10)

## 2025-03-31 ENCOUNTER — HOSPITAL ENCOUNTER (OUTPATIENT)
Dept: HOSPITAL 53 - M SFHCADAM | Age: 65
End: 2025-03-31
Attending: PHYSICIAN ASSISTANT
Payer: MEDICARE

## 2025-03-31 DIAGNOSIS — E55.9: Primary | ICD-10-CM

## 2025-04-03 ENCOUNTER — HOSPITAL ENCOUNTER (OUTPATIENT)
Dept: HOSPITAL 53 - M WHC | Age: 65
End: 2025-04-03
Attending: PHYSICIAN ASSISTANT
Payer: MEDICARE

## 2025-04-03 DIAGNOSIS — Z13.820: Primary | ICD-10-CM

## 2025-04-03 DIAGNOSIS — Z12.31: ICD-10-CM

## 2025-04-03 DIAGNOSIS — Z53.9: ICD-10-CM

## 2025-04-21 ENCOUNTER — HOSPITAL ENCOUNTER (OUTPATIENT)
Dept: HOSPITAL 53 - M WHC | Age: 65
End: 2025-04-21
Attending: PHYSICIAN ASSISTANT
Payer: MEDICARE

## 2025-04-21 DIAGNOSIS — Z12.31: Primary | ICD-10-CM

## 2025-04-21 DIAGNOSIS — M81.0: ICD-10-CM
